# Patient Record
Sex: MALE | Race: WHITE | NOT HISPANIC OR LATINO | Employment: FULL TIME | URBAN - NONMETROPOLITAN AREA
[De-identification: names, ages, dates, MRNs, and addresses within clinical notes are randomized per-mention and may not be internally consistent; named-entity substitution may affect disease eponyms.]

---

## 2020-05-31 ENCOUNTER — HOSPITAL ENCOUNTER (EMERGENCY)
Facility: HOSPITAL | Age: 40
Discharge: HOME | End: 2020-05-31
Attending: EMERGENCY MEDICINE | Admitting: EMERGENCY MEDICINE
Payer: COMMERCIAL

## 2020-05-31 ENCOUNTER — APPOINTMENT (EMERGENCY)
Dept: RADIOLOGY | Facility: HOSPITAL | Age: 40
End: 2020-05-31
Payer: COMMERCIAL

## 2020-05-31 VITALS
HEART RATE: 102 BPM | RESPIRATION RATE: 16 BRPM | BODY MASS INDEX: 25.77 KG/M2 | DIASTOLIC BLOOD PRESSURE: 106 MMHG | SYSTOLIC BLOOD PRESSURE: 173 MMHG | HEIGHT: 70 IN | WEIGHT: 180 LBS | TEMPERATURE: 97.9 F | OXYGEN SATURATION: 98 %

## 2020-05-31 DIAGNOSIS — S93.401A SPRAIN OF RIGHT ANKLE, UNSPECIFIED LIGAMENT, INITIAL ENCOUNTER: Primary | ICD-10-CM

## 2020-05-31 PROCEDURE — 29515 APPLICATION SHORT LEG SPLINT: CPT | Performed by: EMERGENCY MEDICINE

## 2020-05-31 PROCEDURE — 99284 EMERGENCY DEPT VISIT MOD MDM: CPT | Performed by: EMERGENCY MEDICINE

## 2020-05-31 PROCEDURE — 73630 X-RAY EXAM OF FOOT: CPT

## 2020-05-31 PROCEDURE — 99283 EMERGENCY DEPT VISIT LOW MDM: CPT

## 2020-05-31 PROCEDURE — 73610 X-RAY EXAM OF ANKLE: CPT

## 2020-06-05 ENCOUNTER — OFFICE VISIT (OUTPATIENT)
Dept: OBGYN CLINIC | Facility: CLINIC | Age: 40
End: 2020-06-05
Payer: COMMERCIAL

## 2020-06-05 VITALS
BODY MASS INDEX: 27.2 KG/M2 | SYSTOLIC BLOOD PRESSURE: 152 MMHG | HEIGHT: 70 IN | HEART RATE: 89 BPM | WEIGHT: 190 LBS | DIASTOLIC BLOOD PRESSURE: 95 MMHG

## 2020-06-05 DIAGNOSIS — M25.571 PAIN, JOINT, ANKLE AND FOOT, RIGHT: Primary | ICD-10-CM

## 2020-06-05 DIAGNOSIS — S93.401A SPRAIN OF UNSPECIFIED LIGAMENT OF RIGHT ANKLE, INITIAL ENCOUNTER: ICD-10-CM

## 2020-06-05 PROCEDURE — 99204 OFFICE O/P NEW MOD 45 MIN: CPT | Performed by: ORTHOPAEDIC SURGERY

## 2021-04-13 DIAGNOSIS — Z23 ENCOUNTER FOR IMMUNIZATION: ICD-10-CM

## 2021-04-16 ENCOUNTER — IMMUNIZATIONS (OUTPATIENT)
Dept: FAMILY MEDICINE CLINIC | Facility: HOSPITAL | Age: 41
End: 2021-04-16
Attending: INTERNAL MEDICINE

## 2021-04-16 DIAGNOSIS — Z23 ENCOUNTER FOR IMMUNIZATION: Primary | ICD-10-CM

## 2021-04-16 PROCEDURE — 91300 SARS-COV-2 / COVID-19 MRNA VACCINE (PFIZER-BIONTECH) 30 MCG: CPT | Performed by: NURSE PRACTITIONER

## 2021-04-16 PROCEDURE — 0001A SARS-COV-2 / COVID-19 MRNA VACCINE (PFIZER-BIONTECH) 30 MCG: CPT | Performed by: NURSE PRACTITIONER

## 2021-05-07 ENCOUNTER — IMMUNIZATIONS (OUTPATIENT)
Dept: FAMILY MEDICINE CLINIC | Facility: HOSPITAL | Age: 41
End: 2021-05-07

## 2021-05-07 DIAGNOSIS — Z23 ENCOUNTER FOR IMMUNIZATION: Primary | ICD-10-CM

## 2021-05-07 PROCEDURE — 0002A SARS-COV-2 / COVID-19 MRNA VACCINE (PFIZER-BIONTECH) 30 MCG: CPT | Performed by: NURSE PRACTITIONER

## 2021-05-07 PROCEDURE — 91300 SARS-COV-2 / COVID-19 MRNA VACCINE (PFIZER-BIONTECH) 30 MCG: CPT | Performed by: NURSE PRACTITIONER

## 2022-05-12 ENCOUNTER — OFFICE VISIT (OUTPATIENT)
Dept: URGENT CARE | Facility: CLINIC | Age: 42
End: 2022-05-12
Payer: COMMERCIAL

## 2022-05-12 VITALS
TEMPERATURE: 98.9 F | WEIGHT: 190 LBS | RESPIRATION RATE: 16 BRPM | BODY MASS INDEX: 27.2 KG/M2 | HEIGHT: 70 IN | HEART RATE: 85 BPM

## 2022-05-12 DIAGNOSIS — R05.9 COUGH: Primary | ICD-10-CM

## 2022-05-12 PROCEDURE — U0005 INFEC AGEN DETEC AMPLI PROBE: HCPCS

## 2022-05-12 PROCEDURE — U0003 INFECTIOUS AGENT DETECTION BY NUCLEIC ACID (DNA OR RNA); SEVERE ACUTE RESPIRATORY SYNDROME CORONAVIRUS 2 (SARS-COV-2) (CORONAVIRUS DISEASE [COVID-19]), AMPLIFIED PROBE TECHNIQUE, MAKING USE OF HIGH THROUGHPUT TECHNOLOGIES AS DESCRIBED BY CMS-2020-01-R: HCPCS

## 2022-05-12 PROCEDURE — 99283 EMERGENCY DEPT VISIT LOW MDM: CPT

## 2022-05-12 PROCEDURE — G0382 LEV 3 HOSP TYPE B ED VISIT: HCPCS

## 2022-05-12 NOTE — PATIENT INSTRUCTIONS
Use a warm mist humidifier or vaporizer  Hot tea with honey  Warm saline gargle or throat lozenge may help with a sore throat  OTC saline nasal sprays   Drink plenty of fluids

## 2022-05-12 NOTE — PROGRESS NOTES
[facility]        NAME: Giulia Kirby  is a 35 y o  male  : 1980    MRN: [id number]  DATE: May 12, 2022  TIME: 1:46 PM    Assessment and Plan   Cough [R05 9]  1  Cough  COVID Only -Office Collect         Patient Instructions     Use a warm mist humidifier or vaporizer  Hot tea with honey  Warm saline gargle or throat lozenge may help with a sore throat  OTC saline nasal sprays   Drink plenty of fluids  Follow up with PCP in 3-5 days  Proceed to  ER if symptoms worsen  Chief Complaint     Chief Complaint   Patient presents with    COVID-19     C/o chills, sore throat, minor nasal congestion and cough  Daughter Covid + 3 days ago  History of Present Illness       Cough  This is a new problem  The current episode started in the past 7 days  The problem has been unchanged  The problem occurs every few hours  The cough is non-productive  Associated symptoms include chills, a fever, headaches, myalgias, nasal congestion, rhinorrhea and a sore throat  Pertinent negatives include no chest pain, ear congestion, ear pain, postnasal drip, rash, shortness of breath or wheezing  Nothing aggravates the symptoms  He has tried nothing for the symptoms  Review of Systems   Review of Systems   Constitutional: Positive for activity change, appetite change, chills, fatigue and fever  HENT: Positive for congestion, rhinorrhea and sore throat  Negative for ear pain and postnasal drip  Respiratory: Positive for cough  Negative for chest tightness, shortness of breath and wheezing  Cardiovascular: Negative for chest pain and palpitations  Musculoskeletal: Positive for myalgias  Skin: Negative for rash  Neurological: Positive for headaches  Negative for dizziness, weakness and numbness  Current Medications     No current outpatient medications on file      Current Allergies     Allergies as of 2022    (No Known Allergies)            The following portions of the patient's history were reviewed and updated as appropriate: allergies, current medications, past family history, past medical history, past social history, past surgical history and problem list      Past Medical History:   Diagnosis Date    GERD (gastroesophageal reflux disease)        Past Surgical History:   Procedure Laterality Date    CHOLECYSTECTOMY         Family History   Problem Relation Age of Onset    No Known Problems Mother     Cancer Father     Heart disease Father          Medications have been verified  Objective   Pulse 85   Temp 98 9 °F (37 2 °C)   Resp 16   Ht 5' 10" (1 778 m)   Wt 86 2 kg (190 lb)   BMI 27 26 kg/m²        Physical Exam     Physical Exam  Vitals and nursing note reviewed  Constitutional:       General: He is not in acute distress  Appearance: Normal appearance  He is normal weight  He is not ill-appearing or toxic-appearing  HENT:      Right Ear: Tympanic membrane normal       Left Ear: Tympanic membrane normal       Nose: Congestion present  Mouth/Throat:      Pharynx: Oropharynx is clear  Cardiovascular:      Rate and Rhythm: Normal rate and regular rhythm  Pulses: Normal pulses  Pulmonary:      Effort: Pulmonary effort is normal    Musculoskeletal:         General: Normal range of motion  Skin:     General: Skin is warm and dry  Capillary Refill: Capillary refill takes less than 2 seconds  Neurological:      Mental Status: He is alert and oriented to person, place, and time

## 2022-05-13 ENCOUNTER — TELEPHONE (OUTPATIENT)
Dept: URGENT CARE | Facility: CLINIC | Age: 42
End: 2022-05-13

## 2022-05-13 LAB — SARS-COV-2 RNA RESP QL NAA+PROBE: POSITIVE

## 2022-10-27 ENCOUNTER — OFFICE VISIT (OUTPATIENT)
Dept: URGENT CARE | Facility: CLINIC | Age: 42
End: 2022-10-27
Payer: COMMERCIAL

## 2022-10-27 VITALS
TEMPERATURE: 97 F | RESPIRATION RATE: 18 BRPM | SYSTOLIC BLOOD PRESSURE: 130 MMHG | WEIGHT: 185 LBS | HEART RATE: 87 BPM | DIASTOLIC BLOOD PRESSURE: 74 MMHG | OXYGEN SATURATION: 98 % | HEIGHT: 69 IN | BODY MASS INDEX: 27.4 KG/M2

## 2022-10-27 DIAGNOSIS — J06.9 VIRAL URI: Primary | ICD-10-CM

## 2022-10-27 DIAGNOSIS — K12.2 UVULITIS: ICD-10-CM

## 2022-10-27 LAB
S PYO AG THROAT QL: NEGATIVE
SARS-COV-2 AG UPPER RESP QL IA: NEGATIVE
VALID CONTROL: NORMAL

## 2022-10-27 PROCEDURE — 99283 EMERGENCY DEPT VISIT LOW MDM: CPT | Performed by: PHYSICIAN ASSISTANT

## 2022-10-27 PROCEDURE — 87880 STREP A ASSAY W/OPTIC: CPT | Performed by: PHYSICIAN ASSISTANT

## 2022-10-27 PROCEDURE — G0382 LEV 3 HOSP TYPE B ED VISIT: HCPCS | Performed by: PHYSICIAN ASSISTANT

## 2022-10-27 PROCEDURE — 87070 CULTURE OTHR SPECIMN AEROBIC: CPT | Performed by: PHYSICIAN ASSISTANT

## 2022-10-27 PROCEDURE — 87811 SARS-COV-2 COVID19 W/OPTIC: CPT | Performed by: PHYSICIAN ASSISTANT

## 2022-10-27 RX ORDER — PREDNISONE 10 MG/1
10 TABLET ORAL DAILY
Qty: 21 TABLET | Refills: 0 | Status: SHIPPED | OUTPATIENT
Start: 2022-10-27

## 2022-10-27 NOTE — PROGRESS NOTES
[facility]        NAME: Maricarmen ePrez  is a 35 y o  male  : 1980    MRN: [id number]  DATE: 2022  TIME: 2:28 PM    Assessment and Plan   Viral URI [J06 9]  1  Viral URI  Poct Covid 19 Rapid Antigen Test    POCT rapid strepA    Throat culture   2  Uvulitis  predniSONE 10 mg tablet    al mag oxide-diphenhydramine-lidocaine viscous (MAGIC MOUTHWASH) 1:1:1 suspension    Throat culture         Patient Instructions   Medications as prescribed  Tylenol  Saltwater gargles  Warm tea with honey  Tylenol  Drink plenty of fluids  Follow up with PCP in 3-5 days  Proceed to  ER if symptoms worsen  Chief Complaint     Chief Complaint   Patient presents with   • Cold Like Symptoms     Symptoms started yesterday, sinus congestion, sore throat, headache          History of Present Illness       Patient is a 41-year-old male with no significant past medical history presents the office complaining of headache, congestion, and sore throat since last night  States he was looking has uvula in the mirror and noticed it was very swallowing and had a bubble at the end of it  He denies fever, chills, cough, SOB, CP, nausea, vomiting, abdominal pain or rashes  Review of Systems   Review of Systems   Constitutional: Negative for chills and fever  HENT: Positive for congestion, postnasal drip, rhinorrhea and sore throat  Respiratory: Negative for cough and shortness of breath  Cardiovascular: Negative for chest pain and palpitations  Gastrointestinal: Negative for abdominal pain, diarrhea, nausea and vomiting  Musculoskeletal: Positive for myalgias  Neurological: Positive for headaches  Negative for dizziness and light-headedness           Current Medications       Current Outpatient Medications:   •  al mag oxide-diphenhydramine-lidocaine viscous (MAGIC MOUTHWASH) 1:1:1 suspension, Swish and spit 10 mL every 4 (four) hours as needed for mouth pain or discomfort, Disp: 90 mL, Rfl: 0  •  predniSONE 10 mg tablet, Take 1 tablet (10 mg total) by mouth daily Take 6 on day 1, take 5 on day 2, take 4 on day 3, take 3 on day 4, take 2 on day 5, take 1 on day 6 , Disp: 21 tablet, Rfl: 0    Current Allergies     Allergies as of 10/27/2022   • (No Known Allergies)            The following portions of the patient's history were reviewed and updated as appropriate: allergies, current medications, past family history, past medical history, past social history, past surgical history and problem list      Past Medical History:   Diagnosis Date   • GERD (gastroesophageal reflux disease)        Past Surgical History:   Procedure Laterality Date   • CHOLECYSTECTOMY         Family History   Problem Relation Age of Onset   • No Known Problems Mother    • Cancer Father    • Heart disease Father          Medications have been verified  Objective   /74   Pulse 87   Temp (!) 97 °F (36 1 °C)   Resp 18   Ht 5' 9" (1 753 m)   Wt 83 9 kg (185 lb)   SpO2 98%   BMI 27 32 kg/m²   No LMP for male patient  Physical Exam     Physical Exam  Vitals and nursing note reviewed  Constitutional:       Appearance: Normal appearance  He is well-developed  HENT:      Head: Normocephalic and atraumatic  Right Ear: Tympanic membrane, ear canal and external ear normal       Left Ear: Tympanic membrane, ear canal and external ear normal       Nose: Congestion and rhinorrhea present  Mouth/Throat:      Mouth: Mucous membranes are moist       Pharynx: Uvula midline  Pharyngeal swelling, posterior oropharyngeal erythema and uvula swelling present  No oropharyngeal exudate  Tonsils: No tonsillar exudate or tonsillar abscesses  Eyes:      General: Lids are normal       Conjunctiva/sclera: Conjunctivae normal       Pupils: Pupils are equal, round, and reactive to light  Cardiovascular:      Rate and Rhythm: Normal rate and regular rhythm  Heart sounds: Normal heart sounds   No murmur heard  No friction rub  No gallop  Pulmonary:      Effort: Pulmonary effort is normal       Breath sounds: Normal breath sounds  No stridor  No wheezing or rales  Musculoskeletal:         General: Normal range of motion  Cervical back: Neck supple  Lymphadenopathy:      Cervical: Cervical adenopathy present  Skin:     General: Skin is warm and dry  Capillary Refill: Capillary refill takes less than 2 seconds  Neurological:      Mental Status: He is alert           POC rapid COVID-19 negative    POCT rapid strep negative

## 2022-10-27 NOTE — PATIENT INSTRUCTIONS
Medications as prescribed  Tylenol  Saltwater gargles  Warm tea with honey  Tylenol  Drink plenty of fluids  Follow-up with family doctor in 3-5 days  Go to ER symptoms become severe

## 2022-10-29 LAB — BACTERIA THROAT CULT: NORMAL

## 2023-03-02 DIAGNOSIS — R06.89 OTHER ABNORMALITIES OF BREATHING: ICD-10-CM

## 2023-03-02 DIAGNOSIS — Z82.49 FAMILY HISTORY OF ISCHEMIC HEART DISEASE AND OTHER DISEASES OF THE CIRCULATORY SYSTEM: ICD-10-CM

## 2023-03-09 ENCOUNTER — HOSPITAL ENCOUNTER (OUTPATIENT)
Dept: NON INVASIVE DIAGNOSTICS | Facility: HOSPITAL | Age: 43
Discharge: HOME | End: 2023-03-09

## 2023-03-09 DIAGNOSIS — Z82.49 FAMILY HISTORY OF ISCHEMIC HEART DISEASE AND OTHER DISEASES OF THE CIRCULATORY SYSTEM: ICD-10-CM

## 2023-03-09 DIAGNOSIS — R06.89 OTHER ABNORMALITIES OF BREATHING: ICD-10-CM

## 2023-03-09 LAB
CHEST PAIN STATEMENT: NORMAL
MAX DIASTOLIC BP: 80 MMHG
MAX HEART RATE: 176 BPM
MAX HR PERCENT: 100 %
MAX HR: 176 BPM
MAX PREDICTED HEART RATE: 176 BPM
MAX. SYSTOLIC BP: 184 MMHG
PROTOCOL NAME: NORMAL
RATE PRESSURE PRODUCT: NORMAL
SL CV STRESS RECOVERY BP: NORMAL MMHG
SL CV STRESS RECOVERY HR: 115 BPM
SL CV STRESS RECOVERY O2 SAT: 98 %
SL CV STRESS STAGE REACHED: 4
STRESS ANGINA INDEX: 0
STRESS BASELINE BP: NORMAL MMHG
STRESS BASELINE HR: 75 BPM
STRESS DUKE TREADMILL SCORE: 9
STRESS O2 SAT REST: 97 %
STRESS PEAK HR: 176 BPM
STRESS POST ESTIMATED WORKLOAD: 11.4 METS
STRESS POST EXERCISE DUR MIN: 9 MIN
STRESS POST EXERCISE DUR SEC: 26 SEC
STRESS POST O2 SAT PEAK: 98 %
STRESS POST PEAK BP: 184 MMHG
STRESS ST DEPRESSION: 0 MM
TARGET HR FORMULA: NORMAL
TEST INDICATION: NORMAL
TIME IN EXERCISE PHASE: NORMAL

## 2024-06-12 ENCOUNTER — OFFICE VISIT (OUTPATIENT)
Dept: FAMILY MEDICINE CLINIC | Facility: CLINIC | Age: 44
End: 2024-06-12
Payer: COMMERCIAL

## 2024-06-12 VITALS
DIASTOLIC BLOOD PRESSURE: 88 MMHG | BODY MASS INDEX: 27.14 KG/M2 | HEART RATE: 78 BPM | WEIGHT: 189.6 LBS | HEIGHT: 70 IN | OXYGEN SATURATION: 98 % | TEMPERATURE: 97.5 F | SYSTOLIC BLOOD PRESSURE: 132 MMHG

## 2024-06-12 DIAGNOSIS — Z12.11 SCREENING FOR COLORECTAL CANCER: Primary | ICD-10-CM

## 2024-06-12 DIAGNOSIS — Z12.12 SCREENING FOR COLORECTAL CANCER: Primary | ICD-10-CM

## 2024-06-12 DIAGNOSIS — Z11.4 SCREENING FOR HIV (HUMAN IMMUNODEFICIENCY VIRUS): ICD-10-CM

## 2024-06-12 DIAGNOSIS — K58.9 SPASM OF BOWEL: ICD-10-CM

## 2024-06-12 DIAGNOSIS — R10.30 LOWER ABDOMINAL PAIN: ICD-10-CM

## 2024-06-12 DIAGNOSIS — Z11.59 NEED FOR HEPATITIS C SCREENING TEST: ICD-10-CM

## 2024-06-12 PROCEDURE — 99204 OFFICE O/P NEW MOD 45 MIN: CPT | Performed by: PHYSICIAN ASSISTANT

## 2024-06-12 RX ORDER — DICYCLOMINE HYDROCHLORIDE 10 MG/1
10 CAPSULE ORAL
Qty: 30 CAPSULE | Refills: 1 | Status: SHIPPED | OUTPATIENT
Start: 2024-06-12

## 2024-06-12 NOTE — PROGRESS NOTES
Answers submitted by the patient for this visit:  Abdominal Pain Questionnaire (Submitted on 6/11/2024)  Chief Complaint: Abdominal pain  Chronicity: chronic  Onset: more than 1 month ago  Onset quality: gradual  Frequency: daily  Episode duration: 5 Days  Progression since onset: waxing and waning  Pain location: suprapubic region  Pain - numeric: 5/10  Pain quality: cramping  Radiates to: back  anorexia: No  arthralgias: No  belching: No  constipation: No  diarrhea: No  dysuria: No  fever: No  flatus: No  frequency: No  headaches: No  hematochezia: No  hematuria: No  melena: No  myalgias: Yes  nausea: No  weight loss: No  vomiting: No  Aggravated by: being still, certain positions, coughing, movement  Relieved by: activity, bowel movements, palpation, urination    Assessment/Plan:       1. Screening for colorectal cancer  -     Ambulatory Referral to Gastroenterology; Future  2. Need for hepatitis C screening test  3. Screening for HIV (human immunodeficiency virus)  4. Spasm of bowel  -     US abdomen complete; Future; Expected date: 06/12/2024  -     dicyclomine (BENTYL) 10 mg capsule; Take 1 capsule (10 mg total) by mouth 3 (three) times a day before meals  5. Lower abdominal pain  -     US abdomen complete; Future; Expected date: 06/12/2024  -     dicyclomine (BENTYL) 10 mg capsule; Take 1 capsule (10 mg total) by mouth 3 (three) times a day before meals      This 43-year-old male is establishing care at this practice.  I am in the primary care provider for his wife Subha and also take care of both of his children.  Patient states that his abdominal pain is located in the suprapubic area with radiation into the both the right and left lower quadrants.  He describes pain as a cramping pain.  He had a similar pain to this in 2019.  At that time in 2019, the patient's pain was helped by avoiding.  Patient admits having an intermittent stream but otherwise his bladder feels okay.  Patient states in the fall  2023, patient woke up with pain in the lower abdomen and stomach.  Voiding made the pain get better.    Patient states that currently approximately 1 week ago pain occurred with mowing grass.  It was sharp and was exacerbating by crouching over.  His lower abdomen felt very uncomfortable.  The pain waxes and wanes but is still present and never fully dissipates.  It is not helped by avoiding or moving his bowels.  Patient hand [place] of land and this is something he is not quite some time and it does not bother him to do these activities.    Pain is independent of any dietary intake.  He feels that voiding may help the pain to a small extent but it is still present to some extent.    Patient had a cholecystectomy in 2011.  The pain associated with his gallbladder colic was very much different.  He did not have any stones but had 2 HIDA scans and gallbladder dyskinesia was noted with the gallbladder ejection fraction of 26%.  Patient also had a vasectomy in 2013 and those are his only surgeries.    Patient has battled a crisis in his family where his father killed his mother when she was 44 years of age.  Patient was 26 years old and had a younger brother who was 16 at the time of the murder.  He took on the role of raising his younger brother and seeing him through college.  He was dating his girlfriend Subha and was ready to get engaged when the murder occurred.  This adversely affected his life and mental wellbeing.  His father has a life sentence in [facility] outside of [place] in corrections.    Patient is getting a lot of pressure to have his children go to the [employer] and need the grandfather.  He is reluctant to do this because his mother which would be the grandmother of the children is not going to ever have that opportunity to meet his children.  He has been contacted by the parole board to ascertain whether early release should be done.  He has a lot of conflicting emotions about this  since his father has been in [facility] for the last 18 years.    I reviewed the patient's lab profile.  His 10-year ASCVD risk is 2.2% considered very low risk.  Throughout the ordeal of an increased stress in his life, the patient had a stress test done by Dr. Lambert that was considered to be normal.  He has not had any recurrence of his chest discomfort.    Patient's MIKAELA 7 score was a 4 which is considered minimal.  His PHQ 2 score was negative for depression.    We talked about the differential diagnosis for his lower abdominal pain located in the suprapubic area.  Examination was consistent with a lot of tympany in the suprapubic and left lower quadrant which could reflect the patient's distention in his colon.  Distention can cause significant pain and cramping.  We are going to try Bentyl to see if this affects the pain at all.    A total of 50 minutes was spent rendering care for this patient.  This time included review of the patient's electronic medical record, performing the history and physical, reviewing appropriate labs and/or images, developing a treatment and assessment plan, answering patient's questions and concerns, and documenting the patient visit.  I will see the patient in 3 to 4 weeks after he gets an ultrasound of the abdomen.  I will do his complete history and physical at that time.  Subjective:      Patient ID: Mu Sage Jr. is a 43 y.o. male.    HPI: History as above.  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  This has been an area of interest for him his whole life.  He currently denies any pain in his chest heart palpitations dizziness or lightheadedness.    He does not have any dysuria.  He does not have any diarrhea or constipation.  He is no melena or hematochezia.  Patient states that he sometimes has blood on the toilet tissue when wiping or blood dripping in the water in the toilet which he feels is secondary to some hemorrhoids.    He denies fever or  "chills.  No one at home is sick.  The pain is less today than it has been over the last several days.  Activity does not seem to worsen the pain.  The pain is not positional.  He often has pain when laying in bed.  He has no flank pain.  Pain radiates to both lower abdominal areas in the right and left lower quadrant.  Nothing seems to take the pain away other than time letting it dissipate.  He has not really taken anything for the pain today.    The following portions of the patient's history were reviewed and updated as appropriate: allergies, current medications, past family history, past medical history, past social history, past surgical history, and problem list.    Review of Systems no recent URI or viral syndrome.    Objective:      /88 (BP Location: Right arm, Patient Position: Sitting, Cuff Size: Standard)   Pulse 78   Temp 97.5 °F (36.4 °C) (Tympanic)   Ht 5' 10\" (1.778 m)   Wt 86 kg (189 lb 9.5 oz)   SpO2 98%   BMI 27.20 kg/m²          Physical Exam reviewed vital signs.  He is normotensive and afebrile.  He is well-appearing and does not appear in acute distress.    Heart is regular rate without murmur rub or gallops.  Lungs are clear to auscultation.    Abdomen shows normal active bowel sounds.  He has suprapubic tenderness on deep palpation.  Percussion note is tympanic in both lower quadrants and in the suprapubic area.  He has no referred pain and no rebound pain.  No palpable masses felt.  It does appear that there are some increased air structures noted in both lower quadrants.  On his back, he does not have any areas of tenderness or increased muscle tension.  No flank tenderness.    Groin shows no hernias and no areas of tenderness in either inguinal ligament area.  "

## 2024-06-15 ENCOUNTER — TELEPHONE (OUTPATIENT)
Dept: OTHER | Facility: OTHER | Age: 44
End: 2024-06-15

## 2024-06-15 NOTE — TELEPHONE ENCOUNTER
Arielle needs to have ultrasound order clarified for pts ultrasound appt tomorrow. Paged on call provider via [place].

## 2024-06-22 ENCOUNTER — HOSPITAL ENCOUNTER (OUTPATIENT)
Dept: ULTRASOUND IMAGING | Facility: HOSPITAL | Age: 44
Discharge: HOME | End: 2024-06-22
Payer: COMMERCIAL

## 2024-06-22 DIAGNOSIS — R10.2 SUPRAPUBIC PAIN, ACUTE: ICD-10-CM

## 2024-06-22 PROCEDURE — 76857 US EXAM PELVIC LIMITED: CPT

## 2024-06-26 ENCOUNTER — OFFICE VISIT (OUTPATIENT)
Dept: FAMILY MEDICINE CLINIC | Facility: CLINIC | Age: 44
End: 2024-06-26
Payer: COMMERCIAL

## 2024-06-26 VITALS
WEIGHT: 192.46 LBS | BODY MASS INDEX: 27.62 KG/M2 | DIASTOLIC BLOOD PRESSURE: 76 MMHG | SYSTOLIC BLOOD PRESSURE: 119 MMHG | OXYGEN SATURATION: 98 % | HEART RATE: 88 BPM

## 2024-06-26 DIAGNOSIS — Z00.00 ANNUAL PHYSICAL EXAM: Primary | ICD-10-CM

## 2024-06-26 PROCEDURE — 99396 PREV VISIT EST AGE 40-64: CPT | Performed by: PHYSICIAN ASSISTANT

## 2024-06-26 NOTE — PROGRESS NOTES
Assessment/Plan:       1. Annual physical exam      This 43-year-old male establish care at this facility.  He previously had been seen by Dr. Rizvi.  His wife is a physical therapy assistant who knows me from sports medicine and is my patient along with their 2 children.    Patient is very healthy with no chronic medical conditions.  He is in the process of being evaluated for suprapubic pain that is responding to Bentyl.  He had an ultrasound performed on June 22 we are still waiting for the results.  The ultrasound had been ordered previously but had to be canceled due to lack of available technician.  Patient states that when he voids the pain eases.  Bentyl also takes care of the pain.  Pain is unrelated to bowel movements.  He has not had any provocative or aggravating factors.  He has had no fever or chills.  He is not having any problems moving his bowels.    We went over his previous labs.  He has a 2.2% ASCVD risk in the next 10 years.  Metabolic profile and complete blood count are normal.    Patient is feeling well with the legal situation with regard to his father who murdered his mother.  There is a potential for his father to get out of senior living after serving 18 years of time.  He does not want to adjust the court and wants to keep his family out of it.  He is getting a lot of pressure from his father's family to ask for an early release.    A total of 25 minutes was spent rendering care for this patient.  This time included review of the patient's electronic medical record, performing the history and physical, reviewing appropriate labs and/or images, developing a treatment and assessment plan, answering patient's questions and concerns, and documenting the patient visit.  I will see him in 1 year to perform his annual exam.  Subjective:      Patient ID: Mu Sage Jr. is a 43 y.o. male.    HPI: 43-year-old male recently established care.  He has been a call for his colonoscopy.  He is not  having any changes in his bowel or bladder habits.  No melena or hematochezia.  Abdominal pain is listed above.    On occasion, the patient developed some resting tachycardia he does not have pain in his chest and the tachycardia dissipates with deep breathing and relaxation.  He has never had any dizziness lightheadedness syncope or near syncope.  This palpitation does not occur with activity.    He remains quite active with his family with kayaking swimming and hiking and playing yard games.  No difficulty passing his water.  No erectile dysfunction.  No peripheral edema or joint pain.    The following portions of the patient's history were reviewed and updated as appropriate: allergies, current medications, past family history, past medical history, past social history, past surgical history, and problem list.    Review of Systems no recent URI or viral syndrome.  No problems with any seasonal allergy use.  No sinus pain or pressure.  No chronic headaches.    Objective:      /76 (BP Location: Right arm, Patient Position: Sitting, Cuff Size: Standard)   Pulse 88   Wt 87.3 kg (192 lb 7.4 oz)   SpO2 98%   BMI 27.62 kg/m²          Physical Exam reviewed vital signs.  He is normotensive.  BMI 27.  Pulse is 88.    Well-developed well-nourished 43 y.o. year old male who is cooperative with the exam.  Patient is alert and oriented x3.  Patient is appropriate in answering all questions.    HEENT:  Normocephalic.  PERRLA.  EOMs intact.  TMs are clear with identification of bony landmarks.  No tragus or pinnae tenderness.  No pre or posterior auricular adenopathy.  Sinuses without tenderness.  Throat without hyperemia.  Neck:  Supple without adenopathy.  Thyroid midline without thyromegaly or bruits.  No carotid bruits.  Chest symmetric and nontender.  Heart regular rate and rhythm.  No murmur rubs or gallops.  Point of maximum impulse not displaced.  Lungs are clear to auscultation.  Breathing is nonlabored.   Aerating bases well.  Abdomen round and soft positive bowel sounds without masses tenderness or organomegaly.  Extremities reveal adequate peripheral pulses without peripheral edema.    MSK: No motor or sensory deficits noted in either lower extremity.  Romberg test is negative.  Normal finger-to-nose testing.

## 2024-11-20 ENCOUNTER — ANESTHESIA EVENT (OUTPATIENT)
Dept: ANESTHESIOLOGY | Facility: HOSPITAL | Age: 44
End: 2024-11-20

## 2024-11-20 ENCOUNTER — ANESTHESIA (OUTPATIENT)
Dept: ANESTHESIOLOGY | Facility: HOSPITAL | Age: 44
End: 2024-11-20

## 2024-11-20 NOTE — ANESTHESIA PREPROCEDURE EVALUATION
Procedure:  PRE-OP ONLY    Relevant Problems   No relevant active problems      GERD      Physical Exam    Airway    Mallampati score: II  TM Distance: >3 FB  Neck ROM: full     Dental   No notable dental hx     Cardiovascular  Cardiovascular exam normal    Pulmonary  Pulmonary exam normal     Other Findings        Anesthesia Plan  ASA Score- 2     Anesthesia Type- IV sedation with anesthesia with ASA Monitors.         Additional Monitors:     Airway Plan:            Plan Factors-Exercise tolerance (METS): >4 METS.    Chart reviewed.  Imaging results reviewed. Existing labs reviewed. Patient summary reviewed.    Patient is not a current smoker.      Obstructive sleep apnea risk education given perioperatively.        Induction- intravenous.    Postoperative Plan-     Perioperative Resuscitation Plan - Level 1 - Full Code.       Informed Consent-

## 2024-11-21 ENCOUNTER — ANESTHESIA (OUTPATIENT)
Dept: GASTROENTEROLOGY | Facility: HOSPITAL | Age: 44
End: 2024-11-21
Payer: COMMERCIAL

## 2024-11-21 ENCOUNTER — ANESTHESIA EVENT (OUTPATIENT)
Dept: GASTROENTEROLOGY | Facility: HOSPITAL | Age: 44
End: 2024-11-21
Payer: COMMERCIAL

## 2024-11-21 ENCOUNTER — HOSPITAL ENCOUNTER (OUTPATIENT)
Dept: GASTROENTEROLOGY | Facility: HOSPITAL | Age: 44
Setting detail: OUTPATIENT SURGERY
End: 2024-11-21
Attending: HOSPITALIST
Payer: COMMERCIAL

## 2024-11-21 VITALS
TEMPERATURE: 97.6 F | RESPIRATION RATE: 13 BRPM | SYSTOLIC BLOOD PRESSURE: 126 MMHG | HEART RATE: 76 BPM | HEIGHT: 70 IN | DIASTOLIC BLOOD PRESSURE: 95 MMHG | OXYGEN SATURATION: 95 % | WEIGHT: 192 LBS | BODY MASS INDEX: 27.49 KG/M2

## 2024-11-21 DIAGNOSIS — Z12.11 ENCOUNTER FOR SCREENING FOR MALIGNANT NEOPLASM OF COLON: ICD-10-CM

## 2024-11-21 PROCEDURE — 88305 TISSUE EXAM BY PATHOLOGIST: CPT | Performed by: PATHOLOGY

## 2024-11-21 RX ORDER — LIDOCAINE HYDROCHLORIDE 10 MG/ML
INJECTION, SOLUTION EPIDURAL; INFILTRATION; INTRACAUDAL; PERINEURAL AS NEEDED
Status: DISCONTINUED | OUTPATIENT
Start: 2024-11-21 | End: 2024-11-21

## 2024-11-21 RX ORDER — PROPOFOL 10 MG/ML
INJECTION, EMULSION INTRAVENOUS AS NEEDED
Status: DISCONTINUED | OUTPATIENT
Start: 2024-11-21 | End: 2024-11-21

## 2024-11-21 RX ORDER — SODIUM CHLORIDE, SODIUM LACTATE, POTASSIUM CHLORIDE, CALCIUM CHLORIDE 600; 310; 30; 20 MG/100ML; MG/100ML; MG/100ML; MG/100ML
INJECTION, SOLUTION INTRAVENOUS CONTINUOUS PRN
Status: DISCONTINUED | OUTPATIENT
Start: 2024-11-21 | End: 2024-11-21

## 2024-11-21 RX ADMIN — SODIUM CHLORIDE, SODIUM LACTATE, POTASSIUM CHLORIDE, AND CALCIUM CHLORIDE: .6; .31; .03; .02 INJECTION, SOLUTION INTRAVENOUS at 12:39

## 2024-11-21 RX ADMIN — LIDOCAINE HYDROCHLORIDE 50 MG: 10 INJECTION, SOLUTION EPIDURAL; INFILTRATION; INTRACAUDAL; PERINEURAL at 12:45

## 2024-11-21 RX ADMIN — PROPOFOL 150 MCG/KG/MIN: 10 INJECTION, EMULSION INTRAVENOUS at 12:47

## 2024-11-21 RX ADMIN — PROPOFOL 50 MG: 10 INJECTION, EMULSION INTRAVENOUS at 12:48

## 2024-11-21 RX ADMIN — PROPOFOL 100 MG: 10 INJECTION, EMULSION INTRAVENOUS at 12:45

## 2024-11-21 RX ADMIN — Medication 40 MG: at 12:49

## 2024-11-21 RX ADMIN — PROPOFOL 50 MG: 10 INJECTION, EMULSION INTRAVENOUS at 12:46

## 2024-11-21 NOTE — ANESTHESIA PREPROCEDURE EVALUATION
Procedure:  COLONOSCOPY    Relevant Problems   No relevant active problems      GERD      Physical Exam    Airway    Mallampati score: II  TM Distance: >3 FB  Neck ROM: full     Dental   No notable dental hx     Cardiovascular  Cardiovascular exam normal    Pulmonary  Pulmonary exam normal     Other Findings        Anesthesia Plan  ASA Score- 2     Anesthesia Type- IV sedation with anesthesia with ASA Monitors.         Additional Monitors:     Airway Plan:            Plan Factors-Exercise tolerance (METS): >4 METS.    Chart reviewed.  Imaging results reviewed. Existing labs reviewed. Patient summary reviewed.    Patient is not a current smoker.      Obstructive sleep apnea risk education given perioperatively.        Induction- intravenous.    Postoperative Plan-     Perioperative Resuscitation Plan - Level 1 - Full Code.       Informed Consent- Anesthetic plan and risks discussed with patient.  I personally reviewed this patient with the CRNA. Discussed and agreed on the Anesthesia Plan with the CRNA..

## 2024-11-21 NOTE — H&P
Procedure(s):  Colonoscopy with indication(s) of CRC screening      Vitals:    11/21/24 1205   BP: (!) 152/104   Pulse: 92   Resp: 18   Temp: (!) 97.3 °F (36.3 °C)   SpO2: 99%       Physical Exam:  Physical Exam  HENT:      Nose: Nose normal.   Eyes:      Conjunctiva/sclera: Conjunctivae normal.   Cardiovascular:      Rate and Rhythm: Normal rate.   Pulmonary:      Effort: Pulmonary effort is normal.   Abdominal:      Palpations: Abdomen is soft.   Neurological:      General: No focal deficit present.      Mental Status: He is alert.   Psychiatric:         Mood and Affect: Mood normal.              Endoscopy Pre-Procedure Assessment:  Prior to the procedure, the patient is identified.  The patient's history, medications, and allergies have been reviewed.  The patient is competent.     Consent:    We have discussed the procedure in detail. We reviewed risks, benefits and alternative as well as potentional complications including and not limited to medication side effect , infection, bleeding, perforation and the potential need for surgery, ICU admission, CPR, as well as the need for blood product transfusion. Patient verbalized understanding and agreement. All patient questions were answered.     After reviewed the risks and benefits, the patient is deemed in satisfactory condition to undergo the procedure.  The anesthesia plan is to use monitored anesthesia care (MAC).      11/21/24

## 2024-11-21 NOTE — ANESTHESIA POSTPROCEDURE EVALUATION
Post-Op Assessment Note    CV Status:  Stable    Pain management: adequate       Mental Status:  Alert and awake   Hydration Status:  Euvolemic   PONV Controlled:  Controlled   Airway Patency:  Patent     Post Op Vitals Reviewed: Yes    No anethesia notable event occurred.    Staff: CRNA           Last Filed PACU Vitals:  Vitals Value Taken Time   Temp 98.5    Pulse 87 11/21/24 1303   /72    Resp 23 11/21/24 1303   SpO2 98 % 11/21/24 1303   Vitals shown include unfiled device data.    Modified Myles:  Activity: 2 (11/21/2024 12:09 PM)  Respiration: 2 (11/21/2024 12:09 PM)  Circulation: 2 (11/21/2024 12:09 PM)  Consciousness: 2 (11/21/2024 12:09 PM)  Oxygen Saturation: 2 (11/21/2024 12:09 PM)  Modified Myles Score: 10 (11/21/2024 12:09 PM)

## 2024-11-26 PROCEDURE — 88305 TISSUE EXAM BY PATHOLOGIST: CPT | Performed by: PATHOLOGY

## 2025-05-29 ENCOUNTER — OFFICE VISIT (OUTPATIENT)
Dept: FAMILY MEDICINE CLINIC | Facility: CLINIC | Age: 45
End: 2025-05-29
Payer: COMMERCIAL

## 2025-05-29 VITALS
BODY MASS INDEX: 27.58 KG/M2 | DIASTOLIC BLOOD PRESSURE: 84 MMHG | HEART RATE: 69 BPM | OXYGEN SATURATION: 98 % | TEMPERATURE: 98 F | WEIGHT: 192.24 LBS | SYSTOLIC BLOOD PRESSURE: 134 MMHG

## 2025-05-29 DIAGNOSIS — G44.229 CHRONIC TENSION-TYPE HEADACHE, NOT INTRACTABLE: ICD-10-CM

## 2025-05-29 DIAGNOSIS — F07.81 POST CONCUSSIVE SYNDROME: Primary | ICD-10-CM

## 2025-05-29 PROCEDURE — 99213 OFFICE O/P EST LOW 20 MIN: CPT | Performed by: PHYSICIAN ASSISTANT

## 2025-05-29 RX ORDER — MELOXICAM 15 MG/1
15 TABLET ORAL DAILY PRN
Qty: 30 TABLET | Refills: 3 | Status: SHIPPED | OUTPATIENT
Start: 2025-05-29

## 2025-05-29 NOTE — PATIENT INSTRUCTIONS
Topical agent such as Voltaren gel or diclofenac and by the generic equivalent.  The other recommendation would be a topical capsaican which has little peppers peppers patient prefers okay.  Also try ice alternating with heat but lets try the topicals in addition to the oral meloxicam.  I am hopeful that we can break this cycle in 2 weeks.  Please drop me a note in 2 weeks to let me know how you are doing.

## 2025-05-30 NOTE — PROGRESS NOTES
Name: Mu Sage Jr.      : 1980      MRN: [id number]  Encounter Provider: Tita Alvarado PA-C  Encounter Date: 2025   Encounter department: [facility] PRIMARY CARE  :  Assessment & Plan  Post concussive syndrome  Patient sees me for his primary care services.  Patient first noted headache during golf tournament on 2025.  At the golf tournament, the patient first noticed a sensation on the top of his head along with some dizziness and lightheadedness.  He admitted that he was dehydrated that the day of the golf cart and then it was a hot day and he was not keeping his fluid up.  He also had a beer which could also further dehydrate him.  He has been taking Advil and Advil sinus relief without much help.  It is a nagging headache.  He did not have any trauma to his head on that date but admitted that in March, he hit the top of his head on a door post in an escape room.  Orders:    meloxicam (MOBIC) 15 mg tablet; Take 1 tablet (15 mg total) by mouth daily as needed for moderate pain    Chronic tension-type headache, not intractable  Patient's headache is described as aching pressure sensation located at the crown of his head but no real scalp tenderness.  States the pain is nagging.  He rarely had a headache prior to this event.  He did not have any nausea or vomiting.  Headache is not really responding to NSAIDs.  His pain is worse when driving.  Headache is not positional in nature.              History of Present Illness   HPI: 44-year-old male sees me for his primary care services.  He is ordinarily healthy.  And had not been bothered by headaches in the past.  He has no unusual stress in his life.  He is not finding any remedy for this prolonged headache pain.  He had no rash or skin breakage.  No changes in his hair pattern.    He had trauma remotely from when the headache began but it may have been the dehydration and effort during the golf  tournament that rearoused the head pain.  Patient states that he was doing an escape room and that there was a short door frame and he was walking through the frame stood up and hit the top of his head but it was more in the frontal area than where he is having head pain at this time.    He has not applied any topical agents but is willing to try either capsaicin or diclofenac to see if the headache distribution can be distracted and to break the cycle of the head pain.  He is not having neck stiffness.  No visual changes.  Sleep does not make the headache better.  Review of Systems: No recent URI or viral syndrome on there is a question about whether he does have some seasonal allergies.  His wife is a PTA and she has been doing some jaw and facial massages but this is not altered his pain at this point.    Objective   /84   Pulse 69   Temp 98 °F (36.7 °C) (Tympanic)   Wt 87.2 kg (192 lb 3.9 oz)   SpO2 98%   BMI 27.58 kg/m²      Physical Exam: Nontoxic-appearing pleasant 44-year-old male.  Reviewed vital signs.  He is normotensive and afebrile.    He has no tenderness on palpation in the scalp area.  No abnormalities noted on the skin.  No occipital, preauricular or posterior auricular node adenopathy.  No sinus tenderness.  EOMs are intact.    Heart is regular rate without murmur rub or gallop.  Lungs are clear to auscultation.  Administrative Statements   I have spent a total time of 20 minutes in caring for this patient on the day of the visit/encounter including Diagnostic results, Prognosis, Risks and benefits of tx options, Instructions for management, Impressions, Counseling / Coordination of care, Documenting in the medical record, Reviewing/placing orders in the medical record (including tests, medications, and/or procedures), and Obtaining or reviewing history      Patient has an appointment to see me on July 9 for his annual physical.  We can assess whether the headache is resolved after  further conservative care is needed.  I do not have a great explanation for the head pain other than it might be.Triggered by a postconcussive syndrome.  We are going to try topical agents for distraction since the systemic NSAIDs did not work.  Because the head pain is constant, we will try Mobic which will have a 24-hour coverage to see if this affects his head pain.  He is to contact me if his headache does not improve and I will see him before July 9.

## 2025-07-09 ENCOUNTER — OFFICE VISIT (OUTPATIENT)
Dept: FAMILY MEDICINE CLINIC | Facility: CLINIC | Age: 45
End: 2025-07-09
Payer: COMMERCIAL

## 2025-07-09 VITALS
OXYGEN SATURATION: 99 % | SYSTOLIC BLOOD PRESSURE: 126 MMHG | HEART RATE: 64 BPM | HEIGHT: 70 IN | DIASTOLIC BLOOD PRESSURE: 82 MMHG | TEMPERATURE: 98 F | BODY MASS INDEX: 27.33 KG/M2 | WEIGHT: 190.92 LBS

## 2025-07-09 DIAGNOSIS — Z00.00 ANNUAL PHYSICAL EXAM: Primary | ICD-10-CM

## 2025-07-09 DIAGNOSIS — Z13.9 SCREENING DUE: ICD-10-CM

## 2025-07-09 DIAGNOSIS — F07.81 POST CONCUSSIVE SYNDROME: ICD-10-CM

## 2025-07-09 DIAGNOSIS — D17.0 LIPOMA OF NECK: ICD-10-CM

## 2025-07-09 PROCEDURE — 99396 PREV VISIT EST AGE 40-64: CPT | Performed by: PHYSICIAN ASSISTANT

## 2025-07-09 PROCEDURE — 99213 OFFICE O/P EST LOW 20 MIN: CPT | Performed by: PHYSICIAN ASSISTANT

## 2025-07-09 NOTE — PROGRESS NOTES
"Name: Mu Sage Jr.      : 1980      MRN: [id number]  Encounter Provider: Tita Alvarado PA-C  Encounter Date: 2025   Encounter department: [facility] PRIMARY CARE  :  Assessment & Plan  Annual physical exam  Patient here for his annual physical.  Labs from  were reviewed with him.  Normal lipids and metabolic profile.  Since it has been 2 years, we will be repeating the studies.  Patient has lost 2 pounds since his last visit.  He is no longer having any headaches postconcussion.  No concerns about his health at this time.       Post concussive syndrome  Patient hit the top of his head in an escape room in May 2025.  He was out golfing and was dehydrated and had exacerbation of his symptoms.  The symptoms have not been recurring and he has no problems with headaches.       Screening due  Repeating lipid profile and basic metabolic profile.  Orders:    Lipid panel; Future    Basic metabolic panel; Future    Lipoma of neck  Longstanding smooth mobile lipoma right side of his neck.  No intervention necessary at this time.              History of Present Illness   HPI: 44-year-old male sees me for his primary care services.  He remains in excellent health.  No pain in his chest heart palpitations dizziness lightheadedness.  No easy bruising or bleeding.    Enjoys his job.  Enjoys spending time with his family.  Would like some additional time in order to exercise.  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    No change in bowel or bladder habits.  No erectile dysfunction.  No joint complaints.  Review of Systems: No recent URI or viral syndrome.  No further headaches.    Objective   /82 (BP Location: Left arm, Patient Position: Sitting, Cuff Size: Large)   Pulse 64   Temp 98 °F (36.7 °C) (Tympanic)   Ht 5' 10\" (1.778 m)   Wt 86.6 kg (190 lb 14.7 oz)   SpO2 99%   BMI 27.39 " kg/m²      Physical Exam: Reviewed vital signs.  Is normotensive and afebrile.    Well-developed well-nourished 44 y.o. year old male who is cooperative with the exam.  Patient is alert and oriented x3.  Patient is appropriate in answering all questions.    HEENT:  Normocephalic.  PERRLA.  EOMs intact.  TMs are clear with identification of bony landmarks.  No tragus or pinnae tenderness.  No pre or posterior auricular adenopathy.  Sinuses without tenderness.  Throat without hyperemia.  Neck:  Supple without adenopathy.  Thyroid midline without thyromegaly.  He has a small lipoma in the right anterior neck area freely mobile and soft.  Davis test is without lateralization.  Sabina test showed AC greater than BC bilaterally.  Chest symmetric and nontender.  Heart regular rate and rhythm.  No murmur rubs or gallops.  Point of maximum impulse not displaced.  Lungs are clear to auscultation.  Breathing is nonlabored.  Aerating bases well.  Abdomen round and soft positive bowel sounds without masses tenderness or organomegaly.  Extremities reveal adequate peripheral pulses without peripheral edema.  Administrative Statements   I have spent a total time of 30 minutes in caring for this patient on the day of the visit/encounter including Diagnostic results, Prognosis, Impressions, Counseling / Coordination of care, Documenting in the medical record, Reviewing/placing orders in the medical record (including tests, medications, and/or procedures), and Obtaining or reviewing history      I will see him in a year and a day for his annual physical.  I will contact him with his maladies on his metabolic profile and lipid profile..